# Patient Record
(demographics unavailable — no encounter records)

---

## 2025-01-08 NOTE — PHYSICAL EXAM
[Chaperone Present] : A chaperone was present in the examining room during all aspects of the physical examination [28009] : A chaperone was present during the pelvic exam. [FreeTextEntry2] : Pennie [Appropriately responsive] : appropriately responsive [Alert] : alert [No Acute Distress] : no acute distress [Soft] : soft [Non-tender] : non-tender [No Lesions] : no lesions [No Mass] : no mass [Examination Of The Breasts] : a normal appearance [No Discharge] : no discharge [No Masses] : no breast masses were palpable [Labia Majora] : normal [Labia Minora] : normal [No Bleeding] : There was no active vaginal bleeding [Normal] : normal [Uterine Adnexae] : non-palpable

## 2025-01-08 NOTE — HISTORY OF PRESENT ILLNESS
[N] : Patient reports normal menses [FreeTextEntry1] : PATIENT PRESENT FOR ANNUAL GYN EXAM. PATIENT WANTS TO START CONTRACEPTION (IUD).Denies abnormal bleeding, dysuria, pelvic/abdominal pain. Regular monthly periods 30 days apart lasting 4-5 days. LMP 24. Sexually active with one male partner, uses condoms and calendar method.  Hx: OB:  [ETOP x2 (medication), SAB x1 no D&C] GYN: denies cysts, fibroids, abnormal Paps, STIs MED: narcolepsy MEDS: adderall 15mg daily, dextro-amphetamine 20mg daily SURG: lap hay, R shoulder surgery ALLERGY: grapefruit (itching, angioedema) SOC: occasional/soc ETOH; no smoking or drugs FAM: stomach cancer in father, diabetes in grandmother  [Patient reported PAP Smear was normal] : Patient reported PAP Smear was normal [HIV test declined] : HIV test declined [Syphilis test declined] : Syphilis test declined [Gonorrhea test offered] : Gonorrhea test offered [Chlamydia test offered] : Chlamydia test offered [Trichomonas test offered] : Trichomonas test offered [HPV test offered] : HPV test offered [Hepatitis B test declined] : Hepatitis B test declined [Hepatitis C test declined] : Hepatitis C test declined [Y] : Patient uses contraception [Condoms] : uses condoms [Timing] : uses menstrual timing methods [Natural Family Planning] : uses natural family planning [Mammogramdate] : 0 [BreastSonogramDate] : 0 [PapSmeardate] : 6/2023 [BoneDensityDate] : 0 [ColonoscopyDate] : 0 [LMPDate] : 12/23/24 [PGHxTotal] : 3 [PGHxABInduced] : 2 [PGHxABSpont] : 1

## 2025-01-08 NOTE — COUNSELING
[Nutrition/ Exercise/ Weight Management] : nutrition, exercise, weight management [Body Image] : body image [Vitamins/Supplements] : vitamins/supplements [Sunscreen] : sunscreen [Breast Self Exam] : breast self exam [Contraception/ Emergency Contraception/ Safe Sexual Practices] : contraception, emergency contraception, safe sexual practices [Confidentiality] : confidentiality [STD (testing, results, tx)] : STD (testing, results, tx)

## 2025-01-08 NOTE — PLAN
[FreeTextEntry1] : Routine comprehensive GYN exam: Pap/HPV, cultures. BSE and health maintenance topics reviewed. Pt declined bloodwork, does with PCP. Contraceptive counselling done, pt wants MIrena IUD. Mirena ordered, pt to return to office for insertion in two weeks.

## 2025-01-15 NOTE — PROCEDURE
[IUD Placement] : intrauterine device (IUD) placement [Prevention of Pregnancy] : prevention of pregnancy [Risks] : risks [Alternatives] : alternatives [Patient] : patient [Infection] : infection [Bleeding] : bleeding [Pain] : pain [Expulsion] : expulsion [Failure] : failure [Uterine Perforation] : uterine perforation [Neg Pregnancy Test] : negative pregnancy test [Neg GC/Chlamydia] : negative GC/Chlamydia [No Premedication] : No premedication [Tenaculum] : Tenaculum [History of Unprotected Emery] : no history of unprotected intercourse [LMPDate] : 12/23/24 [de-identified] : cervix unable to pass sound beyond 3 cm

## 2025-01-15 NOTE — ASSESSMENT
[FreeTextEntry1] : IUD insertion attempt: Pre-procedure: -Patient presents for MIrena IUD insertion after contraceptive counselling done. -recent GC/CT result: neg/neg 1/8/25 -recent Pap result: NILM+HPV (not 16/18/45) 1/8/25; pt advised to repeat Pap in 6-12 months.  -LMP 12/23/24. Pt denies unprotected sexual activity since last menstrual period.  -[facility] today: negative -Reviewed contraindications to IUD placement: no current pregnancy, not within six weeks postpartum, no allergy to IUD components, no mucopurulent cervicitis, no cervical/uterine/adnexal tenderness/pain, no PID/sepsis, no major uterine distortion, no cervical/uterine cancer, no unexplained vaginal bleeding.  -Reviewed risks, benefits, alternatives to the IUD. Answered pt questions and explained possible complication, including but not limited to: infection, bleeding, IUD displacement/expulsion, uterine perforation, unintended pregnancy and increased risk of ectopic if pregnancy occurs. -Pt reviewed and signed informed consent document, to be scanned into chart.  Procedure note: -Time-out done to verify patient identity and desired procedure (Mirena IUD insertion). -Bimanual exam: uterus midline, no cervical/uterine/adnexal tenderness -Vaginal speculum placed. Cervix visualized and cleansed with betadine. Tenaculum placed on cervix. Using sterile technique, uterine sounding was attempted, however, sound could not be inserted beyond 3cm even after sound was held at the cervical os for attempt at dilation. Pt stated that she would prefer to have IUD inserted under anesthesia if further dilation/maneuvering is required, and the IUD insertion attempt was discontinued. IUD was not inserted. All instruments removed from vagina, minimal bleeding noted. Pt advised on warning signs after attempted IUD insertion/when to seek emergency care. Pt left in satisfactory condition. Pt will follow-up with office staff for OR/procedure booking. Dr. Figueroa notified.

## 2025-03-25 NOTE — HISTORY OF PRESENT ILLNESS
[Pain is well-controlled] : pain is well-controlled [None] : no vaginal bleeding [Pathology reviewed] : pathology reviewed [Fever] : no fever [Chills] : no chills [Nausea] : no nausea [Vomiting] : no vomiting [Diarrhea] : no diarrhea [Vaginal Bleeding] : no vaginal bleeding [Pelvic Pressure] : no pelvic pressure [Dysuria] : no dysuria [Vaginal Discharge] : no vaginal discharge [Constipation] : no constipation [Erythema] : not erythematous [Swelling] : not swollen [Dehiscence] : not dehisced [Healed] : not healed [Discharge] : absent of discharge [de-identified] : 12 [de-identified] : D&C [de-identified] : IUD INSERTION D&C

## 2025-03-25 NOTE — PLAN
[de-identified] : IUD INSERTION/ D&C C/O RLQ PAIN [FreeTextEntry1] : IUD INSERTION D&C RLQ PAIN UA/ C&S CIPRO 250mg BID X 3 DAYS RTO 4-6mths/ PRN